# Patient Record
Sex: MALE | Race: WHITE | URBAN - NONMETROPOLITAN AREA
[De-identification: names, ages, dates, MRNs, and addresses within clinical notes are randomized per-mention and may not be internally consistent; named-entity substitution may affect disease eponyms.]

---
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
## 2022-01-13 ENCOUNTER — TELEPHONE (OUTPATIENT)
Dept: FAMILY MEDICINE CLINIC | Facility: CLINIC | Age: 22
End: 2022-01-13

## 2022-01-13 NOTE — TELEPHONE ENCOUNTER
DIMITRI  Spoke with patient who states he has been having abdominal pain x 1 year. He states his mom has IBS, and he is wondering if this is what he has as well. He states he has had diarrhea x 1 year as well.  Denies any fever, nausea, vomiting, loss of taste

## 2022-01-17 ENCOUNTER — OFFICE VISIT (OUTPATIENT)
Dept: FAMILY MEDICINE CLINIC | Facility: CLINIC | Age: 22
End: 2022-01-17
Payer: COMMERCIAL

## 2022-01-17 VITALS
HEIGHT: 66.5 IN | WEIGHT: 162.25 LBS | HEART RATE: 86 BPM | SYSTOLIC BLOOD PRESSURE: 112 MMHG | RESPIRATION RATE: 12 BRPM | DIASTOLIC BLOOD PRESSURE: 70 MMHG | BODY MASS INDEX: 25.77 KG/M2 | TEMPERATURE: 98 F | OXYGEN SATURATION: 96 %

## 2022-01-17 DIAGNOSIS — K58.0 IRRITABLE BOWEL SYNDROME WITH DIARRHEA: Primary | ICD-10-CM

## 2022-01-17 PROCEDURE — 99214 OFFICE O/P EST MOD 30 MIN: CPT | Performed by: FAMILY MEDICINE

## 2022-01-17 PROCEDURE — 3078F DIAST BP <80 MM HG: CPT | Performed by: FAMILY MEDICINE

## 2022-01-17 PROCEDURE — 3008F BODY MASS INDEX DOCD: CPT | Performed by: FAMILY MEDICINE

## 2022-01-17 PROCEDURE — 3074F SYST BP LT 130 MM HG: CPT | Performed by: FAMILY MEDICINE

## 2022-01-17 RX ORDER — DICYCLOMINE HYDROCHLORIDE 10 MG/1
10 CAPSULE ORAL
Qty: 90 CAPSULE | Refills: 0 | Status: SHIPPED | OUTPATIENT
Start: 2022-01-17 | End: 2022-02-16

## 2022-01-17 NOTE — PROGRESS NOTES
Subjective: Allegra Hudson is a 22year old male who presents for Abdominal Pain (inrm.  5 )     12 months or so  Loose stools  Mother with I-b-s  Worse after salads   Nothing makes it firm    Multiple stools in a day    Not just after a meal        Trire

## 2022-02-10 RX ORDER — DICYCLOMINE HYDROCHLORIDE 10 MG/1
10 CAPSULE ORAL
Qty: 90 CAPSULE | Refills: 0 | Status: SHIPPED | OUTPATIENT
Start: 2022-02-10 | End: 2022-03-12

## 2022-02-23 ENCOUNTER — TELEPHONE (OUTPATIENT)
Dept: FAMILY MEDICINE CLINIC | Facility: CLINIC | Age: 22
End: 2022-02-23

## 2022-02-23 NOTE — TELEPHONE ENCOUNTER
Patient advised that per Dr. Lozada Speak he should start to feel better with the prescribed medication in the next day or two. Advised that if symptoms persist or become unmanageable to please call back or go to the ER.

## 2022-02-23 NOTE — TELEPHONE ENCOUNTER
Luba Car is calling he was having very bad abdominal pain 2/22/22 moring on the lower right side and he went to [facility] in [place]. They discharged him gastroenteritis but he is still having diarrhea and it is dark in color and he is worried about this please call.

## 2022-08-13 ENCOUNTER — TELEPHONE (OUTPATIENT)
Dept: FAMILY MEDICINE CLINIC | Facility: CLINIC | Age: 22
End: 2022-08-13

## 2022-08-13 NOTE — TELEPHONE ENCOUNTER
Reports testicular pain x 2mos. Left side worse than right side. No redness,drainage, swelling. [facility]. Pain is intermittent. Hx of heavy lifting at his previous job. Started new job about 2 weeks ago, he is doing light lifting only. Discussed with Dr. Saray Holland. Ok to be seen next week. Future Appointments   Date Time Provider [facility]   8/18/2022 10:00 AM Sandra Suero MD [facility]         If pain is acute and/or worsening. Should be seen at the ER. Pt verbalized understanding.

## 2022-08-13 NOTE — TELEPHONE ENCOUNTER
TESTICLE PAIN, WANTS APPT NEXT WEEK, NO OPENINGS, ALREADY GOT STD TESTS WHICH WERE NEGATIVE, CALL PT

## 2022-08-18 ENCOUNTER — OFFICE VISIT (OUTPATIENT)
Dept: FAMILY MEDICINE CLINIC | Facility: CLINIC | Age: 22
End: 2022-08-18
Payer: COMMERCIAL

## 2022-08-18 VITALS
SYSTOLIC BLOOD PRESSURE: 128 MMHG | OXYGEN SATURATION: 99 % | RESPIRATION RATE: 12 BRPM | TEMPERATURE: 97 F | BODY MASS INDEX: 24 KG/M2 | HEART RATE: 94 BPM | DIASTOLIC BLOOD PRESSURE: 80 MMHG | WEIGHT: 153 LBS

## 2022-08-18 DIAGNOSIS — N50.82 SCROTAL PAIN: Primary | ICD-10-CM

## 2022-08-18 PROCEDURE — 99214 OFFICE O/P EST MOD 30 MIN: CPT | Performed by: FAMILY MEDICINE

## 2022-08-18 PROCEDURE — 3074F SYST BP LT 130 MM HG: CPT | Performed by: FAMILY MEDICINE

## 2022-08-18 PROCEDURE — 3079F DIAST BP 80-89 MM HG: CPT | Performed by: FAMILY MEDICINE

## 2022-08-18 RX ORDER — DICYCLOMINE HYDROCHLORIDE 10 MG/1
10 CAPSULE ORAL
COMMUNITY

## 2022-08-24 ENCOUNTER — HOSPITAL ENCOUNTER (OUTPATIENT)
Dept: ULTRASOUND IMAGING | Age: 22
Discharge: HOME | End: 2022-08-24
Attending: FAMILY MEDICINE
Payer: COMMERCIAL

## 2022-08-24 DIAGNOSIS — N50.82 SCROTAL PAIN: ICD-10-CM

## 2022-08-24 PROCEDURE — 93975 VASCULAR STUDY: CPT | Performed by: FAMILY MEDICINE

## 2022-08-24 PROCEDURE — 76870 US EXAM SCROTUM: CPT | Performed by: FAMILY MEDICINE

## (undated) DIAGNOSIS — K58.0 IRRITABLE BOWEL SYNDROME WITH DIARRHEA: ICD-10-CM

## (undated) NOTE — LETTER
Date: 7/24/2020    Patient Name: Jerrell Amezquita          To Whom it may concern: This letter has been written at the patient's request. The above patient was evaluated by a provider at the [facility] for treatment of a medical condition.